# Patient Record
Sex: MALE | Race: OTHER | HISPANIC OR LATINO | URBAN - METROPOLITAN AREA
[De-identification: names, ages, dates, MRNs, and addresses within clinical notes are randomized per-mention and may not be internally consistent; named-entity substitution may affect disease eponyms.]

---

## 2018-07-23 ENCOUNTER — EMERGENCY (EMERGENCY)
Facility: HOSPITAL | Age: 28
LOS: 1 days | Discharge: HOME | End: 2018-07-23
Attending: EMERGENCY MEDICINE
Payer: SELF-PAY

## 2018-07-23 VITALS
TEMPERATURE: 98 F | DIASTOLIC BLOOD PRESSURE: 77 MMHG | OXYGEN SATURATION: 99 % | SYSTOLIC BLOOD PRESSURE: 127 MMHG | RESPIRATION RATE: 18 BRPM | HEART RATE: 66 BPM

## 2018-07-23 VITALS
RESPIRATION RATE: 18 BRPM | DIASTOLIC BLOOD PRESSURE: 92 MMHG | TEMPERATURE: 98 F | SYSTOLIC BLOOD PRESSURE: 147 MMHG | HEART RATE: 102 BPM | OXYGEN SATURATION: 97 %

## 2018-07-23 LAB
HCT VFR BLD CALC: 49.8 % — SIGNIFICANT CHANGE UP (ref 39–50)
HGB BLD-MCNC: 17 G/DL — SIGNIFICANT CHANGE UP (ref 13–17)
MCHC RBC-ENTMCNC: 30.4 PG — SIGNIFICANT CHANGE UP (ref 27–34)
MCHC RBC-ENTMCNC: 34.1 GM/DL — SIGNIFICANT CHANGE UP (ref 32–36)
MCV RBC AUTO: 89.1 FL — SIGNIFICANT CHANGE UP (ref 80–100)
PLATELET # BLD AUTO: 318 K/UL — SIGNIFICANT CHANGE UP (ref 150–400)
RBC # BLD: 5.59 M/UL — SIGNIFICANT CHANGE UP (ref 4.2–5.8)
RBC # FLD: 10.8 % — SIGNIFICANT CHANGE UP (ref 10.3–14.5)
WBC # BLD: 9.8 K/UL — SIGNIFICANT CHANGE UP (ref 3.8–10.5)
WBC # FLD AUTO: 9.8 K/UL — SIGNIFICANT CHANGE UP (ref 3.8–10.5)

## 2018-07-23 PROCEDURE — 90715 TDAP VACCINE 7 YRS/> IM: CPT

## 2018-07-23 PROCEDURE — 73030 X-RAY EXAM OF SHOULDER: CPT | Mod: 26,LT

## 2018-07-23 PROCEDURE — 99284 EMERGENCY DEPT VISIT MOD MDM: CPT | Mod: 25

## 2018-07-23 PROCEDURE — 13132 CMPLX RPR F/C/C/M/N/AX/G/H/F: CPT

## 2018-07-23 PROCEDURE — 85027 COMPLETE CBC AUTOMATED: CPT

## 2018-07-23 PROCEDURE — 73030 X-RAY EXAM OF SHOULDER: CPT

## 2018-07-23 PROCEDURE — 90471 IMMUNIZATION ADMIN: CPT

## 2018-07-23 RX ORDER — CEPHALEXIN 500 MG
1 CAPSULE ORAL
Qty: 21 | Refills: 0 | OUTPATIENT
Start: 2018-07-23 | End: 2018-07-29

## 2018-07-23 RX ORDER — SODIUM CHLORIDE 9 MG/ML
1000 INJECTION INTRAMUSCULAR; INTRAVENOUS; SUBCUTANEOUS ONCE
Qty: 0 | Refills: 0 | Status: COMPLETED | OUTPATIENT
Start: 2018-07-23 | End: 2018-07-23

## 2018-07-23 RX ORDER — CEFAZOLIN SODIUM 1 G
1000 VIAL (EA) INJECTION ONCE
Qty: 0 | Refills: 0 | Status: COMPLETED | OUTPATIENT
Start: 2018-07-23 | End: 2018-07-23

## 2018-07-23 RX ORDER — TETANUS TOXOID, REDUCED DIPHTHERIA TOXOID AND ACELLULAR PERTUSSIS VACCINE, ADSORBED 5; 2.5; 8; 8; 2.5 [IU]/.5ML; [IU]/.5ML; UG/.5ML; UG/.5ML; UG/.5ML
0.5 SUSPENSION INTRAMUSCULAR ONCE
Qty: 0 | Refills: 0 | Status: COMPLETED | OUTPATIENT
Start: 2018-07-23 | End: 2018-07-23

## 2018-07-23 RX ADMIN — TETANUS TOXOID, REDUCED DIPHTHERIA TOXOID AND ACELLULAR PERTUSSIS VACCINE, ADSORBED 0.5 MILLILITER(S): 5; 2.5; 8; 8; 2.5 SUSPENSION INTRAMUSCULAR at 16:20

## 2018-07-23 RX ADMIN — Medication 100 MILLIGRAM(S): at 16:19

## 2018-07-23 RX ADMIN — SODIUM CHLORIDE 1000 MILLILITER(S): 9 INJECTION INTRAMUSCULAR; INTRAVENOUS; SUBCUTANEOUS at 14:12

## 2018-07-23 NOTE — ED PROVIDER NOTE - MEDICAL DECISION MAKING DETAILS
Pt w/ deep lac to l shoulder. Will control bleeding w/ immediate suture repair. Will then X-ray to r/o foreign subsequent to irrigation fo wound. Update tetanus and give abx dose.

## 2018-07-23 NOTE — ED PROVIDER NOTE - OBJECTIVE STATEMENT
29 y/o M w/ no hx c/o large deep lac to L shoulder x 1 hr PTA. Was using kitchen knife and he slipped and fell onto knife. Denies other injuries, assault or trauma. last tetanus unknown. Feels paresthesias to l hand. Has normal ROM. Denies weakness. No EtOH, smoking, or drugs. No other complaints. NKDA. 29 y/o M w/ no hx c/o large deep lac to L shoulder x 1 hr PTA. Was using kitchen knife and he slipped and fell onto knife. Denies other injuries, assault or trauma. last tetanus unknown. Feels paresthesias to l hand. Has normal ROM. Denies weakness. No EtOH, smoking, or drugs. No other complaints. NKDA.    [place]  # [id number].

## 2018-07-23 NOTE — ED PROVIDER NOTE - PROGRESS NOTE DETAILS
Pt with no FB seen on X-ray.  Pt doing well after laceration repair and declines pain medication.  Advised to f/u here for wound check in approximately 2 days.  Will prescribe Keflex due to deep laceration and advised wound care and return precautions.  Tetanus updated.

## 2018-07-23 NOTE — ED ADULT TRIAGE NOTE - NS ED NURSE DIRECT TO ROOM YN
"Chief Complaint   Patient presents with     Recheck Medication       Initial /83 (BP Location: Right arm, Patient Position: Chair, Cuff Size: Adult Large)  Pulse 66  Temp 98.2  F (36.8  C) (Oral)  Resp 10  Ht 5' 8\" (1.727 m)  Wt (!) 311 lb (141.1 kg)  BMI 47.29 kg/m2 Estimated body mass index is 47.29 kg/(m^2) as calculated from the following:    Height as of this encounter: 5' 8\" (1.727 m).    Weight as of this encounter: 311 lb (141.1 kg).  Medication Reconciliation: complete   Gregg Alicia CMA       " Yes

## 2018-07-23 NOTE — ED PROVIDER NOTE - PHYSICAL EXAMINATION
SKIN: 7 cm deep linear lac to L shoulder. NV intact. Normal strength and sensation on exam. Normal pulses. No contamination visible. Oozing blood but no arterial bleeding.
